# Patient Record
Sex: MALE | Race: OTHER | NOT HISPANIC OR LATINO | URBAN - METROPOLITAN AREA
[De-identification: names, ages, dates, MRNs, and addresses within clinical notes are randomized per-mention and may not be internally consistent; named-entity substitution may affect disease eponyms.]

---
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
## 2022-10-26 NOTE — ASU PATIENT PROFILE, PEDIATRIC - REASON FOR ADMISSION, PROFILE
Addended by: CANTU, CLAUDIA on: 10/26/2022 09:21 AM     Modules accepted: Orders    
excision of penile lesion